# Patient Record
Sex: FEMALE | Race: WHITE
[De-identification: names, ages, dates, MRNs, and addresses within clinical notes are randomized per-mention and may not be internally consistent; named-entity substitution may affect disease eponyms.]

---

## 2017-12-17 ENCOUNTER — HEALTH MAINTENANCE LETTER (OUTPATIENT)
Age: 57
End: 2017-12-17

## 2020-02-07 DIAGNOSIS — G43.909 MIGRAINE HEADACHE: ICD-10-CM

## 2020-02-07 NOTE — TELEPHONE ENCOUNTER
"SUMAtriptan (IMITREX) 25 MG tablet 10 tablet 1 8/24/2015         Last Written Prescription Date:  08/24/2015  Last Fill Quantity: 10,  # refills: 1   Last office visit: 9/10/2014 with prescribing provider:     Future Office Visit:  Unknown. Left a message to pt return our call, needs to schedule an appointment to establish care    Requested Prescriptions   Pending Prescriptions Disp Refills     SUMAtriptan (IMITREX) 25 MG tablet [Pharmacy Med Name: SUMAtriptan Succinate Oral Tablet 25 MG] 10 tablet 0     Sig: TAKE 1 TABLET (25 MG) BY MOUTH AT ONSET OF HEADACHE FOR MIGRAINE.       Serotonin Agonists Failed - 2/7/2020  2:37 PM        Failed - Blood pressure under 140/90 in past 12 months     BP Readings from Last 3 Encounters:   09/10/14 121/77                 Failed - Serotonin Agonist request needs review.     Please review patient's record. If patient has had 8 or more treatments in the past month, please forward to provider.          Failed - Recent (12 mo) or future (30 days) visit within the authorizing provider's specialty     Patient has had an office visit with the authorizing provider or a provider within the authorizing providers department within the previous 12 mos or has a future within next 30 days. See \"Patient Info\" tab in inbasket, or \"Choose Columns\" in Meds & Orders section of the refill encounter.              Passed - Medication is active on med list        Passed - Patient is age 18 or older        Passed - No active pregnancy on record        Passed - No positive pregnancy test in past 12 months          "

## 2020-02-10 RX ORDER — SUMATRIPTAN 25 MG/1
25 TABLET, FILM COATED ORAL
Qty: 10 TABLET | Refills: 0 | OUTPATIENT
Start: 2020-02-10